# Patient Record
Sex: MALE | Race: WHITE | NOT HISPANIC OR LATINO | URBAN - METROPOLITAN AREA
[De-identification: names, ages, dates, MRNs, and addresses within clinical notes are randomized per-mention and may not be internally consistent; named-entity substitution may affect disease eponyms.]

---

## 2017-04-26 ENCOUNTER — OUTPATIENT (OUTPATIENT)
Dept: OUTPATIENT SERVICES | Facility: HOSPITAL | Age: 77
LOS: 1 days | Discharge: HOME | End: 2017-04-26
Payer: MEDICARE

## 2017-04-26 VITALS
OXYGEN SATURATION: 98 % | DIASTOLIC BLOOD PRESSURE: 87 MMHG | HEIGHT: 70 IN | HEART RATE: 65 BPM | RESPIRATION RATE: 14 BRPM | TEMPERATURE: 98 F | SYSTOLIC BLOOD PRESSURE: 140 MMHG | WEIGHT: 199.96 LBS

## 2017-04-26 DIAGNOSIS — D62 ACUTE POSTHEMORRHAGIC ANEMIA: ICD-10-CM

## 2017-04-26 DIAGNOSIS — M17.12 UNILATERAL PRIMARY OSTEOARTHRITIS, LEFT KNEE: ICD-10-CM

## 2017-04-26 DIAGNOSIS — Z87.09 PERSONAL HISTORY OF OTHER DISEASES OF THE RESPIRATORY SYSTEM: Chronic | ICD-10-CM

## 2017-04-26 DIAGNOSIS — I10 ESSENTIAL (PRIMARY) HYPERTENSION: ICD-10-CM

## 2017-04-26 DIAGNOSIS — Z98.890 OTHER SPECIFIED POSTPROCEDURAL STATES: Chronic | ICD-10-CM

## 2017-04-26 DIAGNOSIS — Z96.651 PRESENCE OF RIGHT ARTIFICIAL KNEE JOINT: Chronic | ICD-10-CM

## 2017-04-26 DIAGNOSIS — N40.0 BENIGN PROSTATIC HYPERPLASIA WITHOUT LOWER URINARY TRACT SYMPTOMS: ICD-10-CM

## 2017-04-26 DIAGNOSIS — Z01.818 ENCOUNTER FOR OTHER PREPROCEDURAL EXAMINATION: ICD-10-CM

## 2017-04-26 DIAGNOSIS — M10.9 GOUT, UNSPECIFIED: ICD-10-CM

## 2017-04-26 DIAGNOSIS — R00.0 TACHYCARDIA, UNSPECIFIED: ICD-10-CM

## 2017-04-26 DIAGNOSIS — I45.10 UNSPECIFIED RIGHT BUNDLE-BRANCH BLOCK: ICD-10-CM

## 2017-04-26 LAB
ANION GAP SERPL CALC-SCNC: 8 MMOL/L — SIGNIFICANT CHANGE UP (ref 5–17)
APPEARANCE UR: CLEAR — SIGNIFICANT CHANGE UP
BACTERIA # UR AUTO: NEGATIVE — SIGNIFICANT CHANGE UP
BASOPHILS # BLD AUTO: 0.1 K/UL — SIGNIFICANT CHANGE UP (ref 0–0.2)
BASOPHILS NFR BLD AUTO: 1 % — SIGNIFICANT CHANGE UP (ref 0–2)
BILIRUB UR-MCNC: NEGATIVE — SIGNIFICANT CHANGE UP
BLD GP AB SCN SERPL QL: SIGNIFICANT CHANGE UP
BUN SERPL-MCNC: 16 MG/DL — SIGNIFICANT CHANGE UP (ref 7–23)
CALCIUM SERPL-MCNC: 8.6 MG/DL — SIGNIFICANT CHANGE UP (ref 8.5–10.1)
CHLORIDE SERPL-SCNC: 108 MMOL/L — SIGNIFICANT CHANGE UP (ref 96–108)
CO2 SERPL-SCNC: 26 MMOL/L — SIGNIFICANT CHANGE UP (ref 22–31)
COLOR SPEC: YELLOW — SIGNIFICANT CHANGE UP
CREAT SERPL-MCNC: 0.78 MG/DL — SIGNIFICANT CHANGE UP (ref 0.5–1.3)
DIFF PNL FLD: NEGATIVE — SIGNIFICANT CHANGE UP
EOSINOPHIL # BLD AUTO: 0.2 K/UL — SIGNIFICANT CHANGE UP (ref 0–0.5)
EOSINOPHIL NFR BLD AUTO: 2.8 % — SIGNIFICANT CHANGE UP (ref 0–6)
EPI CELLS # UR: NEGATIVE — SIGNIFICANT CHANGE UP
GLUCOSE SERPL-MCNC: 91 MG/DL — SIGNIFICANT CHANGE UP (ref 70–99)
GLUCOSE UR QL: NEGATIVE MG/DL — SIGNIFICANT CHANGE UP
HCT VFR BLD CALC: 44.9 % — SIGNIFICANT CHANGE UP (ref 39–50)
HGB BLD-MCNC: 15.2 G/DL — SIGNIFICANT CHANGE UP (ref 13–17)
KETONES UR-MCNC: NEGATIVE — SIGNIFICANT CHANGE UP
LEUKOCYTE ESTERASE UR-ACNC: (no result)
LYMPHOCYTES # BLD AUTO: 2.1 K/UL — SIGNIFICANT CHANGE UP (ref 1–3.3)
LYMPHOCYTES # BLD AUTO: 24.9 % — SIGNIFICANT CHANGE UP (ref 13–44)
MCHC RBC-ENTMCNC: 30.2 PG — SIGNIFICANT CHANGE UP (ref 27–34)
MCHC RBC-ENTMCNC: 33.9 GM/DL — SIGNIFICANT CHANGE UP (ref 32–36)
MCV RBC AUTO: 89.1 FL — SIGNIFICANT CHANGE UP (ref 80–100)
MONOCYTES # BLD AUTO: 0.8 K/UL — SIGNIFICANT CHANGE UP (ref 0–0.9)
MONOCYTES NFR BLD AUTO: 9.4 % — SIGNIFICANT CHANGE UP (ref 2–14)
NEUTROPHILS # BLD AUTO: 5.1 K/UL — SIGNIFICANT CHANGE UP (ref 1.8–7.4)
NEUTROPHILS NFR BLD AUTO: 61.9 % — SIGNIFICANT CHANGE UP (ref 43–77)
NITRITE UR-MCNC: NEGATIVE — SIGNIFICANT CHANGE UP
PH UR: 6 — SIGNIFICANT CHANGE UP (ref 5–8)
PLATELET # BLD AUTO: 218 K/UL — SIGNIFICANT CHANGE UP (ref 150–400)
POTASSIUM SERPL-MCNC: 4.1 MMOL/L — SIGNIFICANT CHANGE UP (ref 3.5–5.3)
POTASSIUM SERPL-SCNC: 4.1 MMOL/L — SIGNIFICANT CHANGE UP (ref 3.5–5.3)
PROT UR-MCNC: NEGATIVE MG/DL — SIGNIFICANT CHANGE UP
RBC # BLD: 5.03 M/UL — SIGNIFICANT CHANGE UP (ref 4.2–5.8)
RBC # FLD: 12 % — SIGNIFICANT CHANGE UP (ref 10.3–14.5)
RBC CASTS # UR COMP ASSIST: SIGNIFICANT CHANGE UP /HPF (ref 0–4)
SODIUM SERPL-SCNC: 142 MMOL/L — SIGNIFICANT CHANGE UP (ref 135–145)
SP GR SPEC: 1.01 — SIGNIFICANT CHANGE UP (ref 1.01–1.02)
TYPE + AB SCN PNL BLD: SIGNIFICANT CHANGE UP
UROBILINOGEN FLD QL: NEGATIVE MG/DL — SIGNIFICANT CHANGE UP
WBC # BLD: 8.3 K/UL — SIGNIFICANT CHANGE UP (ref 3.8–10.5)
WBC # FLD AUTO: 8.3 K/UL — SIGNIFICANT CHANGE UP (ref 3.8–10.5)
WBC UR QL: SIGNIFICANT CHANGE UP

## 2017-04-26 PROCEDURE — 93010 ELECTROCARDIOGRAM REPORT: CPT

## 2017-04-26 PROCEDURE — 73562 X-RAY EXAM OF KNEE 3: CPT | Mod: 26,LT

## 2017-04-26 NOTE — H&P PST ADULT - PSH
History of deviated nasal septum  s/p repair 1987  S/P arthroscopic knee surgery  B/L; 1982, 1992.  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

## 2017-04-26 NOTE — H&P PST ADULT - VISION (WITH CORRECTIVE LENSES IF THE PATIENT USUALLY WEARS THEM):
reading glasses PRN/Partially impaired: cannot see medication labels or newsprint, but can see obstacles in path, and the surrounding layout; can count fingers at arm's length

## 2017-04-26 NOTE — H&P PST ADULT - ASSESSMENT
This is a  76y /o  Male who presents to [facility] prior to proposed procedure with Dr. Jennifer ASHER for left total knee replacement.    1. Labs: per Dr. Jennifer ASHER.  2. PT/INR STAT day of procedure.  3. EKG to be reviewed by Cardiology  4. Knee x-ray to be reviewed by Radiology/Dr. Jennifer ASHER.  5. Clearance with Dr. Garcia PCP, as scheduled  6. EZ sponges, Mupirocin ojntment, and day of procedure instructions provided and reviewed with patient.

## 2017-04-26 NOTE — H&P PST ADULT - PMH
Back spasm    BPH (benign prostatic hyperplasia)    Disc disorder    Gout  H/O  Hypertension    Knee pain, left    Lower back pain    Osteoarthritis of knee  left  Snores

## 2017-04-26 NOTE — H&P PST ADULT - FAMILY HISTORY
Mother  Still living? No  Family history of dementia, Age at diagnosis:      Father  Still living? No  Family history of cerebrovascular accident (CVA) in father, Age at diagnosis: 61-70  Family history of hypertension in father, Age at diagnosis: 71-80

## 2017-04-26 NOTE — H&P PST ADULT - HISTORY OF PRESENT ILLNESS
This is a  76y /o  Male who presents to [facility] prior to proposed procedure with Dr. Jennifer ASHER for left total knee replacement. Repots left knee pain off and on getting worse recently. Reports driving for long period (ie: [place]) causes left knee pain. Seen by Dr. Jennifer ASHER with x-rays taken which demonstrated it was "bone on bone." Evaluated by Dr. Jennifer ASHER and now presents for said procedure.

## 2017-04-27 LAB
MRSA PCR RESULT.: SIGNIFICANT CHANGE UP
S AUREUS DNA NOSE QL NAA+PROBE: DETECTED

## 2017-05-08 RX ORDER — ACETAMINOPHEN 500 MG
975 TABLET ORAL ONCE
Qty: 0 | Refills: 0 | Status: COMPLETED | OUTPATIENT
Start: 2017-05-09 | End: 2017-05-09

## 2017-05-08 RX ORDER — CELECOXIB 200 MG/1
200 CAPSULE ORAL ONCE
Qty: 0 | Refills: 0 | Status: COMPLETED | OUTPATIENT
Start: 2017-05-09 | End: 2017-05-09

## 2017-05-08 RX ORDER — SODIUM CHLORIDE 9 MG/ML
3 INJECTION INTRAMUSCULAR; INTRAVENOUS; SUBCUTANEOUS EVERY 8 HOURS
Qty: 0 | Refills: 0 | Status: DISCONTINUED | OUTPATIENT
Start: 2017-05-09 | End: 2017-05-12

## 2017-05-08 RX ORDER — OXYCODONE HYDROCHLORIDE 5 MG/1
20 TABLET ORAL ONCE
Qty: 0 | Refills: 0 | Status: DISCONTINUED | OUTPATIENT
Start: 2017-05-09 | End: 2017-05-09

## 2017-05-08 RX ORDER — FAMOTIDINE 10 MG/ML
20 INJECTION INTRAVENOUS ONCE
Qty: 0 | Refills: 0 | Status: COMPLETED | OUTPATIENT
Start: 2017-05-09 | End: 2017-05-09

## 2017-05-09 ENCOUNTER — INPATIENT (INPATIENT)
Facility: HOSPITAL | Age: 77
LOS: 2 days | Discharge: HOME HEALTH | End: 2017-05-12
Attending: ORTHOPAEDIC SURGERY | Admitting: ORTHOPAEDIC SURGERY
Payer: MEDICARE

## 2017-05-09 VITALS
HEART RATE: 67 BPM | TEMPERATURE: 98 F | DIASTOLIC BLOOD PRESSURE: 83 MMHG | SYSTOLIC BLOOD PRESSURE: 137 MMHG | WEIGHT: 199.96 LBS | OXYGEN SATURATION: 97 % | HEIGHT: 70 IN | RESPIRATION RATE: 16 BRPM

## 2017-05-09 DIAGNOSIS — Z98.890 OTHER SPECIFIED POSTPROCEDURAL STATES: Chronic | ICD-10-CM

## 2017-05-09 DIAGNOSIS — Z87.09 PERSONAL HISTORY OF OTHER DISEASES OF THE RESPIRATORY SYSTEM: Chronic | ICD-10-CM

## 2017-05-09 DIAGNOSIS — Z96.651 PRESENCE OF RIGHT ARTIFICIAL KNEE JOINT: Chronic | ICD-10-CM

## 2017-05-09 LAB
ANION GAP SERPL CALC-SCNC: 9 MMOL/L — SIGNIFICANT CHANGE UP (ref 5–17)
APTT BLD: 30.6 SEC — SIGNIFICANT CHANGE UP (ref 27.5–37.4)
BUN SERPL-MCNC: 15 MG/DL — SIGNIFICANT CHANGE UP (ref 7–23)
CALCIUM SERPL-MCNC: 7.9 MG/DL — LOW (ref 8.5–10.1)
CHLORIDE SERPL-SCNC: 108 MMOL/L — SIGNIFICANT CHANGE UP (ref 96–108)
CO2 SERPL-SCNC: 27 MMOL/L — SIGNIFICANT CHANGE UP (ref 22–31)
CREAT SERPL-MCNC: 0.82 MG/DL — SIGNIFICANT CHANGE UP (ref 0.5–1.3)
GLUCOSE SERPL-MCNC: 99 MG/DL — SIGNIFICANT CHANGE UP (ref 70–99)
HCT VFR BLD CALC: 40 % — SIGNIFICANT CHANGE UP (ref 39–50)
HGB BLD-MCNC: 13.3 G/DL — SIGNIFICANT CHANGE UP (ref 13–17)
INR BLD: 1 RATIO — SIGNIFICANT CHANGE UP (ref 0.88–1.16)
INR BLD: 1.06 RATIO — SIGNIFICANT CHANGE UP (ref 0.88–1.16)
MCHC RBC-ENTMCNC: 29.4 PG — SIGNIFICANT CHANGE UP (ref 27–34)
MCHC RBC-ENTMCNC: 33.2 GM/DL — SIGNIFICANT CHANGE UP (ref 32–36)
MCV RBC AUTO: 88.4 FL — SIGNIFICANT CHANGE UP (ref 80–100)
PLATELET # BLD AUTO: 204 K/UL — SIGNIFICANT CHANGE UP (ref 150–400)
POTASSIUM SERPL-MCNC: 4.3 MMOL/L — SIGNIFICANT CHANGE UP (ref 3.5–5.3)
POTASSIUM SERPL-SCNC: 4.3 MMOL/L — SIGNIFICANT CHANGE UP (ref 3.5–5.3)
PROTHROM AB SERPL-ACNC: 10.8 SEC — SIGNIFICANT CHANGE UP (ref 9.8–12.7)
PROTHROM AB SERPL-ACNC: 11.5 SEC — SIGNIFICANT CHANGE UP (ref 9.8–12.7)
RBC # BLD: 4.52 M/UL — SIGNIFICANT CHANGE UP (ref 4.2–5.8)
RBC # FLD: 12.3 % — SIGNIFICANT CHANGE UP (ref 10.3–14.5)
SODIUM SERPL-SCNC: 144 MMOL/L — SIGNIFICANT CHANGE UP (ref 135–145)
WBC # BLD: 11.4 K/UL — HIGH (ref 3.8–10.5)
WBC # FLD AUTO: 11.4 K/UL — HIGH (ref 3.8–10.5)

## 2017-05-09 PROCEDURE — 73560 X-RAY EXAM OF KNEE 1 OR 2: CPT | Mod: 26,LT

## 2017-05-09 PROCEDURE — 88305 TISSUE EXAM BY PATHOLOGIST: CPT | Mod: 26

## 2017-05-09 PROCEDURE — 99223 1ST HOSP IP/OBS HIGH 75: CPT

## 2017-05-09 RX ORDER — SODIUM CHLORIDE 9 MG/ML
1000 INJECTION INTRAMUSCULAR; INTRAVENOUS; SUBCUTANEOUS
Qty: 0 | Refills: 0 | Status: DISCONTINUED | OUTPATIENT
Start: 2017-05-09 | End: 2017-05-12

## 2017-05-09 RX ORDER — ACETAMINOPHEN 500 MG
975 TABLET ORAL EVERY 6 HOURS
Qty: 0 | Refills: 0 | Status: DISCONTINUED | OUTPATIENT
Start: 2017-05-09 | End: 2017-05-12

## 2017-05-09 RX ORDER — MEPERIDINE HYDROCHLORIDE 50 MG/ML
12.5 INJECTION INTRAMUSCULAR; INTRAVENOUS; SUBCUTANEOUS
Qty: 0 | Refills: 0 | Status: DISCONTINUED | OUTPATIENT
Start: 2017-05-09 | End: 2017-05-09

## 2017-05-09 RX ORDER — CEFAZOLIN SODIUM 1 G
2000 VIAL (EA) INJECTION EVERY 6 HOURS
Qty: 0 | Refills: 0 | Status: COMPLETED | OUTPATIENT
Start: 2017-05-09 | End: 2017-05-09

## 2017-05-09 RX ORDER — PANTOPRAZOLE SODIUM 20 MG/1
40 TABLET, DELAYED RELEASE ORAL DAILY
Qty: 0 | Refills: 0 | Status: DISCONTINUED | OUTPATIENT
Start: 2017-05-09 | End: 2017-05-12

## 2017-05-09 RX ORDER — OXYCODONE HYDROCHLORIDE 5 MG/1
20 TABLET ORAL EVERY 12 HOURS
Qty: 0 | Refills: 0 | Status: DISCONTINUED | OUTPATIENT
Start: 2017-05-09 | End: 2017-05-11

## 2017-05-09 RX ORDER — ONDANSETRON 8 MG/1
4 TABLET, FILM COATED ORAL EVERY 6 HOURS
Qty: 0 | Refills: 0 | Status: DISCONTINUED | OUTPATIENT
Start: 2017-05-09 | End: 2017-05-12

## 2017-05-09 RX ORDER — ACETAMINOPHEN 500 MG
650 TABLET ORAL EVERY 6 HOURS
Qty: 0 | Refills: 0 | Status: DISCONTINUED | OUTPATIENT
Start: 2017-05-09 | End: 2017-05-12

## 2017-05-09 RX ORDER — ASPIRIN/CALCIUM CARB/MAGNESIUM 324 MG
325 TABLET ORAL
Qty: 0 | Refills: 0 | Status: DISCONTINUED | OUTPATIENT
Start: 2017-05-10 | End: 2017-05-12

## 2017-05-09 RX ORDER — DIPHENHYDRAMINE HCL 50 MG
25 CAPSULE ORAL AT BEDTIME
Qty: 0 | Refills: 0 | Status: DISCONTINUED | OUTPATIENT
Start: 2017-05-09 | End: 2017-05-12

## 2017-05-09 RX ORDER — FINASTERIDE 5 MG/1
1 TABLET, FILM COATED ORAL
Qty: 0 | Refills: 0 | COMMUNITY

## 2017-05-09 RX ORDER — ASCORBIC ACID 60 MG
500 TABLET,CHEWABLE ORAL
Qty: 0 | Refills: 0 | Status: DISCONTINUED | OUTPATIENT
Start: 2017-05-09 | End: 2017-05-12

## 2017-05-09 RX ORDER — HYDROMORPHONE HYDROCHLORIDE 2 MG/ML
0.5 INJECTION INTRAMUSCULAR; INTRAVENOUS; SUBCUTANEOUS
Qty: 0 | Refills: 0 | Status: DISCONTINUED | OUTPATIENT
Start: 2017-05-09 | End: 2017-05-12

## 2017-05-09 RX ORDER — OXYCODONE HYDROCHLORIDE 5 MG/1
5 TABLET ORAL EVERY 6 HOURS
Qty: 0 | Refills: 0 | Status: DISCONTINUED | OUTPATIENT
Start: 2017-05-09 | End: 2017-05-12

## 2017-05-09 RX ORDER — ONDANSETRON 8 MG/1
4 TABLET, FILM COATED ORAL ONCE
Qty: 0 | Refills: 0 | Status: DISCONTINUED | OUTPATIENT
Start: 2017-05-09 | End: 2017-05-09

## 2017-05-09 RX ORDER — CELECOXIB 200 MG/1
200 CAPSULE ORAL
Qty: 0 | Refills: 0 | Status: DISCONTINUED | OUTPATIENT
Start: 2017-05-09 | End: 2017-05-12

## 2017-05-09 RX ORDER — SENNA PLUS 8.6 MG/1
2 TABLET ORAL AT BEDTIME
Qty: 0 | Refills: 0 | Status: DISCONTINUED | OUTPATIENT
Start: 2017-05-09 | End: 2017-05-12

## 2017-05-09 RX ORDER — FINASTERIDE 5 MG/1
5 TABLET, FILM COATED ORAL DAILY
Qty: 0 | Refills: 0 | Status: DISCONTINUED | OUTPATIENT
Start: 2017-05-09 | End: 2017-05-12

## 2017-05-09 RX ORDER — DOCUSATE SODIUM 100 MG
100 CAPSULE ORAL THREE TIMES A DAY
Qty: 0 | Refills: 0 | Status: DISCONTINUED | OUTPATIENT
Start: 2017-05-09 | End: 2017-05-12

## 2017-05-09 RX ORDER — OXYCODONE HYDROCHLORIDE 5 MG/1
10 TABLET ORAL EVERY 6 HOURS
Qty: 0 | Refills: 0 | Status: DISCONTINUED | OUTPATIENT
Start: 2017-05-09 | End: 2017-05-12

## 2017-05-09 RX ORDER — FENTANYL CITRATE 50 UG/ML
50 INJECTION INTRAVENOUS
Qty: 0 | Refills: 0 | Status: DISCONTINUED | OUTPATIENT
Start: 2017-05-09 | End: 2017-05-09

## 2017-05-09 RX ORDER — AMLODIPINE BESYLATE 2.5 MG/1
5 TABLET ORAL DAILY
Qty: 0 | Refills: 0 | Status: DISCONTINUED | OUTPATIENT
Start: 2017-05-09 | End: 2017-05-12

## 2017-05-09 RX ORDER — FERROUS SULFATE 325(65) MG
325 TABLET ORAL
Qty: 0 | Refills: 0 | Status: DISCONTINUED | OUTPATIENT
Start: 2017-05-09 | End: 2017-05-12

## 2017-05-09 RX ORDER — FOLIC ACID 0.8 MG
1 TABLET ORAL DAILY
Qty: 0 | Refills: 0 | Status: DISCONTINUED | OUTPATIENT
Start: 2017-05-09 | End: 2017-05-12

## 2017-05-09 RX ORDER — POLYETHYLENE GLYCOL 3350 17 G/17G
17 POWDER, FOR SOLUTION ORAL DAILY
Qty: 0 | Refills: 0 | Status: DISCONTINUED | OUTPATIENT
Start: 2017-05-09 | End: 2017-05-12

## 2017-05-09 RX ORDER — AMLODIPINE BESYLATE 2.5 MG/1
1 TABLET ORAL
Qty: 0 | Refills: 0 | COMMUNITY

## 2017-05-09 RX ORDER — SODIUM CHLORIDE 9 MG/ML
1000 INJECTION, SOLUTION INTRAVENOUS
Qty: 0 | Refills: 0 | Status: DISCONTINUED | OUTPATIENT
Start: 2017-05-09 | End: 2017-05-09

## 2017-05-09 RX ORDER — MAGNESIUM HYDROXIDE 400 MG/1
30 TABLET, CHEWABLE ORAL DAILY
Qty: 0 | Refills: 0 | Status: DISCONTINUED | OUTPATIENT
Start: 2017-05-09 | End: 2017-05-12

## 2017-05-09 RX ADMIN — ONDANSETRON 4 MILLIGRAM(S): 8 TABLET, FILM COATED ORAL at 16:16

## 2017-05-09 RX ADMIN — AMLODIPINE BESYLATE 5 MILLIGRAM(S): 2.5 TABLET ORAL at 21:49

## 2017-05-09 RX ADMIN — CELECOXIB 200 MILLIGRAM(S): 200 CAPSULE ORAL at 16:16

## 2017-05-09 RX ADMIN — Medication 975 MILLIGRAM(S): at 16:17

## 2017-05-09 RX ADMIN — Medication 500 MILLIGRAM(S): at 17:49

## 2017-05-09 RX ADMIN — FAMOTIDINE 20 MILLIGRAM(S): 10 INJECTION INTRAVENOUS at 10:23

## 2017-05-09 RX ADMIN — SODIUM CHLORIDE 100 MILLILITER(S): 9 INJECTION, SOLUTION INTRAVENOUS at 12:43

## 2017-05-09 RX ADMIN — Medication 975 MILLIGRAM(S): at 23:15

## 2017-05-09 RX ADMIN — FINASTERIDE 5 MILLIGRAM(S): 5 TABLET, FILM COATED ORAL at 21:50

## 2017-05-09 RX ADMIN — Medication 1 TABLET(S): at 21:49

## 2017-05-09 RX ADMIN — Medication 100 MILLIGRAM(S): at 23:16

## 2017-05-09 RX ADMIN — CELECOXIB 200 MILLIGRAM(S): 200 CAPSULE ORAL at 16:50

## 2017-05-09 RX ADMIN — Medication 100 MILLIGRAM(S): at 21:50

## 2017-05-09 RX ADMIN — CELECOXIB 200 MILLIGRAM(S): 200 CAPSULE ORAL at 10:23

## 2017-05-09 RX ADMIN — OXYCODONE HYDROCHLORIDE 20 MILLIGRAM(S): 5 TABLET ORAL at 10:24

## 2017-05-09 RX ADMIN — SODIUM CHLORIDE 75 MILLILITER(S): 9 INJECTION INTRAMUSCULAR; INTRAVENOUS; SUBCUTANEOUS at 16:08

## 2017-05-09 RX ADMIN — Medication 975 MILLIGRAM(S): at 10:23

## 2017-05-09 RX ADMIN — Medication 100 MILLIGRAM(S): at 16:08

## 2017-05-09 RX ADMIN — SODIUM CHLORIDE 3 MILLILITER(S): 9 INJECTION INTRAMUSCULAR; INTRAVENOUS; SUBCUTANEOUS at 21:56

## 2017-05-09 RX ADMIN — Medication 1 TABLET(S): at 17:49

## 2017-05-09 RX ADMIN — Medication 975 MILLIGRAM(S): at 16:50

## 2017-05-09 NOTE — PHYSICAL THERAPY INITIAL EVALUATION ADULT - CRITERIA FOR SKILLED THERAPEUTIC INTERVENTIONS
rehab potential/anticipated discharge recommendation/risk reduction/prevention/therapy frequency/functional limitations in following categories/predicted duration of therapy intervention/anticipated equipment needs at discharge

## 2017-05-09 NOTE — CONSULT NOTE ADULT - ASSESSMENT
This is a 76 year old male s/p left total knee replacement with moderate thrombosis risk due to BMI, however ambulating well with PT- and low bleeding risk.     Discussed the risks vs benefits of full dose aspirin therapy with patient. Agrees with treatment and understands the necessity of therapy.    Plan:    Enteric coated ASA 325mg PO BID x 30 days  Pepcid 20 mg PO daily  Daily CBC/BMP  Venodynes  Enc ambulation    Thank you for this consult, will sign off for now, do not hesitate to reconsult.

## 2017-05-09 NOTE — PHYSICAL THERAPY INITIAL EVALUATION ADULT - ACTIVE RANGE OF MOTION EXAMINATION, REHAB EVAL
monisha. upper extremity Active ROM was WNL (within normal limits)/bilateral  lower extremity Active ROM was WFL (within functional limits)/L TKR 0-105P

## 2017-05-09 NOTE — CONSULT NOTE ADULT - SUBJECTIVE AND OBJECTIVE BOX
HPI  HPI:      Patient is a 76y old  Male who presents with a chief complaint of " I am having my left knee replaced." (09 May 2017 10:03)      Consulted by Dr. Rodriguez for VTE prophylaxis, risk stratification, and anticoagulation management.    PAST MEDICAL & SURGICAL HISTORY:  Gout: H/O  BPH (benign prostatic hyperplasia)  Snores  Hypertension  Lower back pain  Back spasm  Disc disorder  Osteoarthritis of knee: left  Knee pain, left  S/P arthroscopic knee surgery: B/L; 1982, 1992.  Status post total right knee replacement: 12/2015  History of deviated nasal septum: s/p repair 1987      BMI: 28.7    CrCl: 81.6    Caprini VTE Risk Score: 8    IMPROVE Bleeding Risk Score: 2.5 (low)    Falls Risk:   High ( x )  Mod (  )  Low (  )      FAMILY HISTORY:  Family history of hypertension in father (Father)  Family history of cerebrovascular accident (CVA) in father (Father)  Family history of dementia (Mother)    Denies any personal or familial history of clotting or bleeding disorders.    Allergies    No Known Allergies    Intolerances        REVIEW OF SYSTEMS    (  )Fever	     (  )Constipation	(  )SOB				(  )Headache	(  )Dysuria  (  )Chills	     (  )Melena	(  )Dyspnea present on exertion	                    (  )Dizziness                    (  )Polyuria  (  )Nausea	     (  )Hematochezia	(  )Cough			                    (  )Syncope   	(  )Hematuria  (  )Vomiting    (  )Chest Pain	(  )Wheezing			(  )Weakness  (  )Diarrhea     (  )Palpitations	(  )Anorexia			(  )Myalgia    All other review of systems negative: Yes    Unable to obtain review of systems due to:      PHYSICAL EXAM:    Constitutional: Appears Well    Neurological: A& O x 3    Skin: Warm    Respiratory and Thorax: normal effort; Breath sounds: normal; No rales/wheezing/rhonchi  	  Cardiovascular: S1, S2, regular, NMBR	    Gastrointestinal: BS + x 4Q, nontender	    Genitourinary:  Bladder nondistended, nontender    Musculoskeletal:   General Right:   no muscle/joint tenderness,   normal tone, no joint swelling,   ROM: full	    General Left:   + muscle/joint tenderness,   normal tone, no joint swelling,   ROM: limited    Knee:  Left: Incision: ; Dressing CDI; Drain: hemovac ; Type of drng.: serosang.; Amt. of drng: small    Lower extrems:   Right: no calf tenderness              negative meme's sign               + pedal pulses    Left:   no calf tenderness              negative meme's sign               + pedal pulses                          13.3   11.4  )-----------( 204      ( 09 May 2017 13:34 )             40.0       05-09    144  |  108  |  15  ----------------------------<  99  4.3   |  27  |  0.82    Ca    7.9<L>      09 May 2017 13:34        PT/INR - ( 09 May 2017 13:34 )   PT: 11.5 sec;   INR: 1.06 ratio         PTT - ( 09 May 2017 09:51 )  PTT:30.6 sec				    MEDICATIONS  (STANDING):  sodium chloride 0.9% lock flush 3milliLiter(s) IV Push every 8 hours  acetaminophen   Tablet. 975milliGRAM(s) Oral every 6 hours  oxyCODONE ER Tablet 20milliGRAM(s) Oral every 12 hours  celecoxib 200milliGRAM(s) Oral with breakfast  sodium chloride 0.9%. 1000milliLiter(s) IV Continuous <Continuous>  calcium carbonate 1250 mG + Vitamin D (OsCal 500 + D) 1Tablet(s) Oral three times a day  pantoprazole    Tablet 40milliGRAM(s) Oral daily  polyethylene glycol 3350 17Gram(s) Oral daily  docusate sodium 100milliGRAM(s) Oral three times a day  ferrous    sulfate 325milliGRAM(s) Oral three times a day with meals  folic acid 1milliGRAM(s) Oral daily  multivitamin 1Tablet(s) Oral daily  ascorbic acid 500milliGRAM(s) Oral two times a day  amLODIPine   Tablet 5milliGRAM(s) Oral daily  finasteride 5milliGRAM(s) Oral daily  ceFAZolin   IVPB 2000milliGRAM(s) IV Intermittent every 6 hours      Vital Signs Last 24 Hrs  T(C): 36.6, Max: 36.7 (05-09 @ 09:59)  T(F): 97.8, Max: 98.1 (05-09 @ 09:59)  HR: 62 (58 - 73)  BP: 116/71 (113/79 - 142/84)  BP(mean): --  RR: 16 (12 - 17)  SpO2: 98% (93% - 100%)    DVT Prophylaxis:  LMWH                   (  )  Heparin SQ           (  )  Coumadin             (  )  Xarelto                  (  )  Eliquis                   (  )  Venodynes           ( x )  Ambulation          ( x )  UFH                       (  )  Contraindicated  (  )

## 2017-05-09 NOTE — CONSULT NOTE ADULT - SUBJECTIVE AND OBJECTIVE BOX
CC-Patient is a 76y old  Male who presents with a chief complaint of " I am having my left knee replaced." (09 May 2017 10:03)    HPI:  77yo/M with PMH BPH, HTN presented for elective LT TKR. Medical consult called for postop medical management.    PMH- as above  PSH- s/p RT TKR, deviated septum  Soc hx- alcohol- socially  Fam hx- m-dementia, f- HTN    5/9/17- +nausea    Review of system- All 10 systems reviewed and is as per HPI otherwise negative.     T(C): 36.6, Max: 36.7 (05-09 @ 09:59)  HR: 62 (58 - 73)  BP: 116/71 (113/79 - 142/84)  RR: 16 (12 - 17)  SpO2: 98% (93% - 100%)  Wt(kg): --    LABS:                        13.3   11.4  )-----------( 204      ( 09 May 2017 13:34 )             40.0     05-09    144  |  108  |  15  ----------------------------<  99  4.3   |  27  |  0.82    Ca   7.9<L>      09 May 2017 13:34  PT/INR - ( 09 May 2017 13:34 )   PT: 11.5 sec;   INR: 1.06 ratio    PTT - ( 09 May 2017 09:51 )  PTT:30.6 sec    PHYSICAL EXAM:    GENERAL: NAD, well-groomed, well-developed  HEAD:  Atraumatic, Normocephalic  EYES: EOMI, PERRLA, conjunctiva and sclera clear  HEENT: Moist mucous membranes  NECK: Supple, No JVD  NERVOUS SYSTEM:  Alert & Oriented X3, Motor Strength 5/5 B/L upper and lower extremities; DTRs 2+ intact and symmetric  CHEST/LUNG: Clear to auscultation bilaterally; No rales, rhonchi, wheezing, or rubs  HEART: Regular rate and rhythm; No murmurs, rubs, or gallops  ABDOMEN: Soft, Nontender, Nondistended; Bowel sounds present  GENITOURINARY- Voiding, no palpable bladder  EXTREMITIES:  2+ Peripheral Pulses, No clubbing, cyanosis, or edema  MUSCULOSKELTAL- LT knee dressing dry, +hemovac      Daily Height in cm: 177.8 (09 May 2017 09:59)        sodium chloride 0.9% lock flush 3milliLiter(s) IV Push every 8 hours  acetaminophen   Tablet. 975milliGRAM(s) Oral every 6 hours  oxyCODONE ER Tablet 20milliGRAM(s) Oral every 12 hours  celecoxib 200milliGRAM(s) Oral with breakfast  oxyCODONE IR 5milliGRAM(s) Oral every 6 hours PRN  oxyCODONE IR 10milliGRAM(s) Oral every 6 hours PRN  HYDROmorphone  Injectable 0.5milliGRAM(s) IV Push every 3 hours PRN  ondansetron Injectable 4milliGRAM(s) IV Push every 6 hours PRN  sodium chloride 0.9%. 1000milliLiter(s) IV Continuous <Continuous>  acetaminophen   Tablet 650milliGRAM(s) Oral every 6 hours PRN  acetaminophen   Tablet 650milliGRAM(s) Oral every 6 hours PRN  acetaminophen   Tablet. 650milliGRAM(s) Oral every 6 hours PRN  aluminum hydroxide/magnesium hydroxide/simethicone Suspension 30milliLiter(s) Oral four times a day PRN  ondansetron Injectable 4milliGRAM(s) IV Push every 6 hours PRN  calcium carbonate 1250 mG + Vitamin D (OsCal 500 + D) 1Tablet(s) Oral three times a day  pantoprazole    Tablet 40milliGRAM(s) Oral daily  diphenhydrAMINE   Capsule 25milliGRAM(s) Oral at bedtime PRN  magnesium hydroxide Suspension 30milliLiter(s) Oral daily PRN  polyethylene glycol 3350 17Gram(s) Oral daily  senna 2Tablet(s) Oral at bedtime PRN  docusate sodium 100milliGRAM(s) Oral three times a day  ferrous    sulfate 325milliGRAM(s) Oral three times a day with meals  folic acid 1milliGRAM(s) Oral daily  multivitamin 1Tablet(s) Oral daily  ascorbic acid 500milliGRAM(s) Oral two times a day  amLODIPine   Tablet 5milliGRAM(s) Oral daily  finasteride 5milliGRAM(s) Oral daily  ceFAZolin   IVPB 2000milliGRAM(s) IV Intermittent every 6 hours      Assessment/Plan  #S/p LT TKR  Ortho f/u appreciated  Monitor HH  PT as tolerated  AC consult  Bowel regimen  Incentive spirometry    #BPH  Monitor for voiding difficulties    #HTN, stable    #Dispo- thank you for consult, will follow with you

## 2017-05-10 LAB
ANION GAP SERPL CALC-SCNC: 7 MMOL/L — SIGNIFICANT CHANGE UP (ref 5–17)
BUN SERPL-MCNC: 25 MG/DL — HIGH (ref 7–23)
CALCIUM SERPL-MCNC: 8.6 MG/DL — SIGNIFICANT CHANGE UP (ref 8.5–10.1)
CHLORIDE SERPL-SCNC: 107 MMOL/L — SIGNIFICANT CHANGE UP (ref 96–108)
CO2 SERPL-SCNC: 27 MMOL/L — SIGNIFICANT CHANGE UP (ref 22–31)
CREAT SERPL-MCNC: 0.8 MG/DL — SIGNIFICANT CHANGE UP (ref 0.5–1.3)
GLUCOSE SERPL-MCNC: 119 MG/DL — HIGH (ref 70–99)
HCT VFR BLD CALC: 35.1 % — LOW (ref 39–50)
HGB BLD-MCNC: 12 G/DL — LOW (ref 13–17)
MCHC RBC-ENTMCNC: 30.6 PG — SIGNIFICANT CHANGE UP (ref 27–34)
MCHC RBC-ENTMCNC: 34.2 GM/DL — SIGNIFICANT CHANGE UP (ref 32–36)
MCV RBC AUTO: 89.5 FL — SIGNIFICANT CHANGE UP (ref 80–100)
PLATELET # BLD AUTO: 177 K/UL — SIGNIFICANT CHANGE UP (ref 150–400)
POTASSIUM SERPL-MCNC: 5 MMOL/L — SIGNIFICANT CHANGE UP (ref 3.5–5.3)
POTASSIUM SERPL-SCNC: 5 MMOL/L — SIGNIFICANT CHANGE UP (ref 3.5–5.3)
RBC # BLD: 3.92 M/UL — LOW (ref 4.2–5.8)
RBC # FLD: 12.1 % — SIGNIFICANT CHANGE UP (ref 10.3–14.5)
SODIUM SERPL-SCNC: 141 MMOL/L — SIGNIFICANT CHANGE UP (ref 135–145)
WBC # BLD: 15.7 K/UL — HIGH (ref 3.8–10.5)
WBC # FLD AUTO: 15.7 K/UL — HIGH (ref 3.8–10.5)

## 2017-05-10 RX ORDER — MUPIROCIN 20 MG/G
1 OINTMENT TOPICAL
Qty: 0 | Refills: 0 | COMMUNITY

## 2017-05-10 RX ORDER — ASPIRIN/CALCIUM CARB/MAGNESIUM 324 MG
0 TABLET ORAL
Qty: 0 | Refills: 0 | COMMUNITY

## 2017-05-10 RX ORDER — FAMOTIDINE 10 MG/ML
1 INJECTION INTRAVENOUS
Qty: 14 | Refills: 0 | OUTPATIENT
Start: 2017-05-10 | End: 2017-05-24

## 2017-05-10 RX ORDER — ASPIRIN/CALCIUM CARB/MAGNESIUM 324 MG
1 TABLET ORAL
Qty: 60 | Refills: 0 | OUTPATIENT
Start: 2017-05-10 | End: 2017-06-09

## 2017-05-10 RX ORDER — DOCUSATE SODIUM 100 MG
1 CAPSULE ORAL
Qty: 28 | Refills: 0 | OUTPATIENT
Start: 2017-05-10 | End: 2017-05-24

## 2017-05-10 RX ADMIN — CELECOXIB 200 MILLIGRAM(S): 200 CAPSULE ORAL at 09:54

## 2017-05-10 RX ADMIN — SODIUM CHLORIDE 75 MILLILITER(S): 9 INJECTION INTRAMUSCULAR; INTRAVENOUS; SUBCUTANEOUS at 06:39

## 2017-05-10 RX ADMIN — AMLODIPINE BESYLATE 5 MILLIGRAM(S): 2.5 TABLET ORAL at 06:35

## 2017-05-10 RX ADMIN — Medication 100 MILLIGRAM(S): at 06:36

## 2017-05-10 RX ADMIN — Medication 975 MILLIGRAM(S): at 12:30

## 2017-05-10 RX ADMIN — PANTOPRAZOLE SODIUM 40 MILLIGRAM(S): 20 TABLET, DELAYED RELEASE ORAL at 12:32

## 2017-05-10 RX ADMIN — Medication 1 TABLET(S): at 12:29

## 2017-05-10 RX ADMIN — Medication 325 MILLIGRAM(S): at 17:17

## 2017-05-10 RX ADMIN — Medication 975 MILLIGRAM(S): at 18:02

## 2017-05-10 RX ADMIN — SODIUM CHLORIDE 3 MILLILITER(S): 9 INJECTION INTRAMUSCULAR; INTRAVENOUS; SUBCUTANEOUS at 06:38

## 2017-05-10 RX ADMIN — Medication 1 MILLIGRAM(S): at 12:33

## 2017-05-10 RX ADMIN — Medication 975 MILLIGRAM(S): at 07:35

## 2017-05-10 RX ADMIN — Medication 325 MILLIGRAM(S): at 12:33

## 2017-05-10 RX ADMIN — Medication 100 MILLIGRAM(S): at 13:33

## 2017-05-10 RX ADMIN — Medication 1 TABLET(S): at 13:33

## 2017-05-10 RX ADMIN — Medication 500 MILLIGRAM(S): at 17:15

## 2017-05-10 RX ADMIN — FINASTERIDE 5 MILLIGRAM(S): 5 TABLET, FILM COATED ORAL at 12:32

## 2017-05-10 RX ADMIN — SODIUM CHLORIDE 3 MILLILITER(S): 9 INJECTION INTRAMUSCULAR; INTRAVENOUS; SUBCUTANEOUS at 13:34

## 2017-05-10 RX ADMIN — Medication 325 MILLIGRAM(S): at 09:54

## 2017-05-10 RX ADMIN — Medication 1 TABLET(S): at 06:35

## 2017-05-10 RX ADMIN — Medication 325 MILLIGRAM(S): at 06:35

## 2017-05-10 RX ADMIN — Medication 975 MILLIGRAM(S): at 06:35

## 2017-05-10 RX ADMIN — Medication 500 MILLIGRAM(S): at 06:35

## 2017-05-10 RX ADMIN — SODIUM CHLORIDE 3 MILLILITER(S): 9 INJECTION INTRAMUSCULAR; INTRAVENOUS; SUBCUTANEOUS at 21:40

## 2017-05-10 RX ADMIN — Medication 975 MILLIGRAM(S): at 17:18

## 2017-05-10 RX ADMIN — Medication 325 MILLIGRAM(S): at 17:16

## 2017-05-10 RX ADMIN — POLYETHYLENE GLYCOL 3350 17 GRAM(S): 17 POWDER, FOR SOLUTION ORAL at 12:30

## 2017-05-10 RX ADMIN — CELECOXIB 200 MILLIGRAM(S): 200 CAPSULE ORAL at 10:54

## 2017-05-10 RX ADMIN — Medication 100 MILLIGRAM(S): at 21:37

## 2017-05-10 NOTE — DISCHARGE NOTE ADULT - CARE PLAN
Principal Discharge DX:	Knee pain, left  Goal:	Return to ADLs  Instructions for follow-up, activity and diet:	1.	Pain Control  2.	Walking with full weight bearing as tolerated, with assistive devices (walker/Cane as Needed)  3.	DVT Prophylaxis per anticoagulation team recommendations  4.	PT as needed  5.	Follow up with Dr. Rodriguez as Outpatient in 10-14 Days after Discharge from the Hospital or Rehab. Call Office For Appointment.   6.	Remove Staples Post-Op Day 14, and Remove Dressing Post-Op Day 10, with Daily Dressing Changes as Need.  7.	Ice affected area as Needed  8.	Keep Dressing Clean and dry. Principal Discharge DX:	Knee pain, left  Goal:	Return to ADLs  Instructions for follow-up, activity and diet:	1.	Pain Control  2.	Walking with full weight bearing as tolerated, with assistive devices (walker/Cane as Needed)  3.	DVT Prophylaxis per anticoagulation team recommendations  4.	PT as needed  5.	Follow up with Dr. Rordiguez as Outpatient in 10-14 Days after Discharge from the Hospital or Rehab. Call Office For Appointment.   6.	Remove Staples Post-Op Day 14, and Remove Dressing Post-Op Day 10, with Daily Dressing Changes as Need.  7.	Ice affected area as Needed  8.	Keep Dressing Clean and dry. Principal Discharge DX:	Knee pain, left  Goal:	Return to ADLs  Instructions for follow-up, activity and diet:	Discharge Instructions for Total Knee Arthroplasty    1.  Diet: Resume previous diet    2. Activity: WBAT, Rolling walker, Daily PT. Gentle ROM 0-full as tolerated.     3. Call with: fever over 101, wound redness, drainage or open area, calf pain/calf swelling    4. Wound Care: Remove old and place new Aquacel  bandage to Knee every 7 days (5/21). Remove Sutures/Staples Post Op Day #14 (5/23) so long as wound is healed, no drainage or open area. OK to Shower with Aquacel.  Avoid direct water beating on bandage.  Continue ICE packs to knee.    5. DVT PE Prophylaxis:  Continue Aspirin for 30 days total. With pepcid.    6. Labs: Check H&H weekly while on Anticoagulation    7. Follow Up: Orthopedics, 10-14 days in office. Call to schedule. If going home, eRX sent to your pharmacy for .

## 2017-05-10 NOTE — DISCHARGE NOTE ADULT - MEDICATION SUMMARY - MEDICATIONS TO STOP TAKING
I will STOP taking the medications listed below when I get home from the hospital:    aspirin 81 mg oral delayed release capsule  --  by mouth   -- pt instructed to follow Dr. Kay pre-procedure instructions.    mupirocin 2% topical ointment  -- Apply on skin to affected area

## 2017-05-10 NOTE — DISCHARGE NOTE ADULT - CARE PROVIDER_API CALL
Linden Rodriguez), Orthopaedic Surgery  [address] [place] [address]  Phone: [phone]  Fax: [phone]

## 2017-05-10 NOTE — DISCHARGE NOTE ADULT - PLAN OF CARE
Return to ADLs 1.	Pain Control  2.	Walking with full weight bearing as tolerated, with assistive devices (walker/Cane as Needed)  3.	DVT Prophylaxis per anticoagulation team recommendations  4.	PT as needed  5.	Follow up with Dr. Rodriguez as Outpatient in 10-14 Days after Discharge from the Hospital or Rehab. Call Office For Appointment.   6.	Remove Staples Post-Op Day 14, and Remove Dressing Post-Op Day 10, with Daily Dressing Changes as Need.  7.	Ice affected area as Needed  8.	Keep Dressing Clean and dry. Discharge Instructions for Total Knee Arthroplasty    1.  Diet: Resume previous diet    2. Activity: WBAT, Rolling walker, Daily PT. Gentle ROM 0-full as tolerated.     3. Call with: fever over 101, wound redness, drainage or open area, calf pain/calf swelling    4. Wound Care: Remove old and place new Aquacel  bandage to Knee every 7 days (5/21). Remove Sutures/Staples Post Op Day #14 (5/23) so long as wound is healed, no drainage or open area. OK to Shower with Aquacel.  Avoid direct water beating on bandage.  Continue ICE packs to knee.    5. DVT PE Prophylaxis:  Continue Aspirin for 30 days total. With pepcid.    6. Labs: Check H&H weekly while on Anticoagulation    7. Follow Up: Orthopedics, 10-14 days in office. Call to schedule. If going home, eRX sent to your pharmacy for .

## 2017-05-10 NOTE — DISCHARGE NOTE ADULT - HOSPITAL COURSE
The patient is a 76 year old male status post elective total knee Arthroplasty to the left knee after failing outpatient nonoperative conservative management.  Patient presented to [facility] after being medically cleared for an elective surgical procedure. The patient was taken to the operating room on date mentioned above. Prophylactic antibiotics were started before the procedure and continued for 24 hours.  There were no complications during the procedure and patient tolerated the procedure well.  The patient was transferred to recovery room in stable condition and subsequently to surgical floor.  Patient was placed on Aspirin for anticoagulation.  All home medications were continued.  The patient received physical therapy daily and daily labs were followed.  The dressing was kept clean, dry, intact.  The rest of the hospital stay was unremarkable. H&P:  Pt is a76y Male POD 3 s/p Left Total Knee Arthroplasty. Pt is afebrile with stable vital signs. Pain is controlled. Alert and Oriented. Exam reveals intact EHL FHL TA GS, +DP. Dressing is clean and dry with a New Aquacel bandage on.    Vital Signs Last 24 Hrs  T(C): 36.8, Max: 37.6 (05-11 @ 21:19)  T(F): 98.3, Max: 99.7 (05-11 @ 21:19)  HR: 81 (81 - 97)  BP: 147/88 (96/62 - 147/88)  BP(mean): --  RR: 16 (16 - 16)  SpO2: 99% (96% - 99%)                        10.5   11.4  )-----------( 157      ( 12 May 2017 06:06 )             32.6         Hospital Course:  Patient presented to [facility] after being medically cleared for an elective surgical procedure, having failed outpatient non-operative conservative management. Prophylactic antibiotics were started before the procedure and continued for 24 hours. They were admitted after surgery to the orthopedic floor.   There were no complications during the hospital stay.     Routine consults were obtained from the Anticoagulation Team for DVT/PE prophylaxis, from Physical Therapy for twice daily PT starting on POD 0, and from the Hospitalist for Medical Co-management. Patient was placed on initial dose of Heparin followed by ECASA 325 BID for anticoagulation.  Pertinent home medications were continued.  Daily labs were followed.      On POD 0 the pt was OOB with PT and there were no overnight events. POD1-2 he had some dizziness walking with PT that resolved on its own, after fluid bolus. He was monitored on telemetry for this overnight as recommended by medicine. PT was continued daily and pt had very little pain throughout his hospitalization requiring only tylenol. On POD 3 a new Aquacel dressing was applied. The pt is ready today for DC to home with home PT.  The orthopedic Attending is aware and agrees.

## 2017-05-10 NOTE — PROGRESS NOTE ADULT - SUBJECTIVE AND OBJECTIVE BOX
Patient seen and examined. Pain controlled. No acute events overnight     PTT - ( 09 May 2017 09:51 )  PTT:30.6 sec  Vital Signs Last 24 Hrs  T(C): 36.6, Max: 36.7 (05-09 @ 09:59)  T(F): 97.9, Max: 98.1 (05-09 @ 09:59)  HR: 67 (58 - 73)  BP: 114/64 (113/79 - 142/84)  BP(mean): --  RR: 16 (12 - 17)  SpO2: 93% (93% - 100%)    Physical Exam  Gen: NAD  LLE:   Dressing c/d/i  +HMV  +ehl/fhl/ta/gs function  L2-S1 silt  Dp/pt pulse intact  No calf ttp  Compartments soft    A/P: 76y Male sp L TKA POD 1  Pain control  DVT ppx  PT/WBAT/OOB  FU labs  Ice/elevate  Medical management appreciated  Incentive spirometry  Dispo planning

## 2017-05-10 NOTE — PROGRESS NOTE ADULT - SUBJECTIVE AND OBJECTIVE BOX
Orthopedics     POD 1 Left Total Knee Arthroplasty  Pain is controlled. Pt feeling remarkably well. No nausea or vomiting. Has been OOB with PT and able to bend his knee AROM to 80.    Vital Signs Last 24 Hrs  T(C): 37, Max: 37 (05-10 @ 07:07)  T(F): 98.6, Max: 98.6 (05-10 @ 07:07)  HR: 65 (58 - 73)  BP: 112/64 (112/64 - 142/84)  BP(mean): --  RR: 16 (12 - 17)  SpO2: 95% (93% - 100%)                        12.0   15.7  )-----------( 177      ( 10 May 2017 06:25 )             35.1     10 May 2017 06:25    141    |  107    |  25     ----------------------------<  119    5.0     |  27     |  0.80     Ca    8.6        10 May 2017 06:25      PT/INR - ( 09 May 2017 13:34 )   PT: 11.5 sec;   INR: 1.06 ratio         PTT - ( 09 May 2017 09:51 )  PTT:30.6 sec  Exam:  NAD AAOx3  Dressing clean and dry  +EHL FHL TA GS  SILT toes 1-5  +DP  Calf Soft NT    A/P:  Stable POD 1 Left Total Knee Arthroplasty  -Analgesia  -DVT PE ppx  -OOB PT  -Hemovac drain removed uneventfully

## 2017-05-10 NOTE — DISCHARGE NOTE ADULT - MEDICATION SUMMARY - MEDICATIONS TO TAKE
I will START or STAY ON the medications listed below when I get home from the hospital:    finasteride 5 mg oral tablet  -- 1 tab(s) by mouth once a day  -- Indication: For prior med    amLODIPine 5 mg oral tablet  -- 1 tab(s) by mouth once a day  -- Indication: For prior med I will START or STAY ON the medications listed below when I get home from the hospital:    finasteride 5 mg oral tablet  -- 1 tab(s) by mouth once a day  -- Indication: For prior med    Ecotrin 325 mg oral delayed release tablet  -- 1 tab(s) by mouth 2 times a day  -- Swallow whole.  Do not crush.  Take with food or milk.    -- Indication: For blood clot prevention. Do not take ASpirin 81mg with this.    oxyCODONE-acetaminophen 5mg-325mg oral tablet  -- 1 tab(s) by mouth every 4 hours, As Needed -for moderate pain 2 tabs PO q6h prn severe pain MDD:6  -- Caution federal law prohibits the transfer of this drug to any person other  than the person for whom it was prescribed.  May cause drowsiness.  Alcohol may intensify this effect.  Use care when operating dangerous machinery.  This prescription cannot be refilled.  This product contains acetaminophen.  Do not use  with any other product containing acetaminophen to prevent possible liver damage.  Using more of this medication than prescribed may cause serious breathing problems.    -- Indication: For severe pain    amLODIPine 5 mg oral tablet  -- 1 tab(s) by mouth once a day  -- Indication: For prior med    famotidine 20 mg oral tablet  -- 1 tab(s) by mouth once a day while on Aspirin MDD:1  -- Indication: For GI protection    Colace 100 mg oral capsule  -- 1 cap(s) by mouth 2 times a day -for constipation  -- Medication should be taken with plenty of water.    -- Indication: For stool softener I will START or STAY ON the medications listed below when I get home from the hospital:    finasteride 5 mg oral tablet  -- 1 tab(s) by mouth once a day  -- Indication: For prior med    oxyCODONE-acetaminophen 5mg-325mg oral tablet  -- 1 tab(s) by mouth every 4 hours, As Needed -for moderate pain 2 tabs PO q6h prn severe pain MDD:6  -- Caution federal law prohibits the transfer of this drug to any person other  than the person for whom it was prescribed.  May cause drowsiness.  Alcohol may intensify this effect.  Use care when operating dangerous machinery.  This prescription cannot be refilled.  This product contains acetaminophen.  Do not use  with any other product containing acetaminophen to prevent possible liver damage.  Using more of this medication than prescribed may cause serious breathing problems.    -- Indication: For severe pain    Aspirin Enteric Coated 325 mg oral delayed release tablet  -- 1 tab(s) by mouth 2 times a day  -- Swallow whole.  Do not crush.  Take with food or milk.    -- Indication: For dvt ppx    amLODIPine 5 mg oral tablet  -- 1 tab(s) by mouth once a day  -- Indication: For prior med    famotidine 20 mg oral tablet  -- 1 tab(s) by mouth once a day while on Aspirin MDD:1  -- Indication: For GI protection    Colace 100 mg oral capsule  -- 1 cap(s) by mouth 2 times a day -for constipation  -- Medication should be taken with plenty of water.    -- Indication: For stool softener

## 2017-05-10 NOTE — PROGRESS NOTE ADULT - SUBJECTIVE AND OBJECTIVE BOX
CC-Patient is a 76y old  Male who presents with a chief complaint of " I am having my left knee replaced." (09 May 2017 10:03)    HPI:  77yo/M with PMH BPH, HTN presented for elective LT TKR. Medical consult called for postop medical management.    PMH- as above  PSH- s/p RT TKR, deviated septum  Soc hx- alcohol- socially  Fam hx- m-dementia, f- HTN    5/9/17- +nausea  5/10/17 doing good. ambulated with PT    Review of system- All 10 systems reviewed and is as per HPI otherwise negative.     Vital Signs Last 24 Hrs  T(C): 37, Max: 37 (05-10 @ 07:07)  T(F): 98.6, Max: 98.6 (05-10 @ 07:07)  HR: 65 (58 - 69)  BP: 112/64 (112/64 - 142/84)  BP(mean): --  RR: 16 (12 - 17)  SpO2: 95% (93% - 100%)    LABS:                        12.0   15.7  )-----------( 177      ( 10 May 2017 06:25 )             35.1     10 May 2017 06:25    141    |  107    |  25     ----------------------------<  119    5.0     |  27     |  0.80     Ca    8.6        10 May 2017 06:25  PT/INR - ( 09 May 2017 13:34 )   PT: 11.5 sec;   INR: 1.06 ratio    PTT - ( 09 May 2017 09:51 )  PTT:30.6 sec    PHYSICAL EXAM:    GENERAL: NAD, well-groomed, well-developed  HEAD:  Atraumatic, Normocephalic  EYES: EOMI, PERRLA, conjunctiva and sclera clear  HEENT: Moist mucous membranes  NECK: Supple, No JVD  NERVOUS SYSTEM:  Alert & Oriented X3, Motor Strength 5/5 B/L upper and lower extremities; DTRs 2+ intact and symmetric  CHEST/LUNG: Clear to auscultation bilaterally; No rales, rhonchi, wheezing, or rubs  HEART: Regular rate and rhythm; No murmurs, rubs, or gallops  ABDOMEN: Soft, Nontender, Nondistended; Bowel sounds present  GENITOURINARY- Voiding, no palpable bladder  EXTREMITIES:  2+ Peripheral Pulses, No clubbing, cyanosis, or edema  MUSCULOSKELTAL- LT knee dressing dry, +hemovac    Daily Height in cm: 177.8 (09 May 2017 09:59)      sodium chloride 0.9% lock flush 3milliLiter(s) IV Push every 8 hours  acetaminophen   Tablet. 975milliGRAM(s) Oral every 6 hours  oxyCODONE ER Tablet 20milliGRAM(s) Oral every 12 hours  celecoxib 200milliGRAM(s) Oral with breakfast  oxyCODONE IR 5milliGRAM(s) Oral every 6 hours PRN  oxyCODONE IR 10milliGRAM(s) Oral every 6 hours PRN  HYDROmorphone  Injectable 0.5milliGRAM(s) IV Push every 3 hours PRN  ondansetron Injectable 4milliGRAM(s) IV Push every 6 hours PRN  sodium chloride 0.9%. 1000milliLiter(s) IV Continuous <Continuous>  acetaminophen   Tablet 650milliGRAM(s) Oral every 6 hours PRN  acetaminophen   Tablet 650milliGRAM(s) Oral every 6 hours PRN  acetaminophen   Tablet. 650milliGRAM(s) Oral every 6 hours PRN  aluminum hydroxide/magnesium hydroxide/simethicone Suspension 30milliLiter(s) Oral four times a day PRN  ondansetron Injectable 4milliGRAM(s) IV Push every 6 hours PRN  calcium carbonate 1250 mG + Vitamin D (OsCal 500 + D) 1Tablet(s) Oral three times a day  pantoprazole    Tablet 40milliGRAM(s) Oral daily  diphenhydrAMINE   Capsule 25milliGRAM(s) Oral at bedtime PRN  magnesium hydroxide Suspension 30milliLiter(s) Oral daily PRN  polyethylene glycol 3350 17Gram(s) Oral daily  senna 2Tablet(s) Oral at bedtime PRN  docusate sodium 100milliGRAM(s) Oral three times a day  ferrous    sulfate 325milliGRAM(s) Oral three times a day with meals  folic acid 1milliGRAM(s) Oral daily  multivitamin 1Tablet(s) Oral daily  ascorbic acid 500milliGRAM(s) Oral two times a day  amLODIPine   Tablet 5milliGRAM(s) Oral daily  finasteride 5milliGRAM(s) Oral daily  ceFAZolin   IVPB 2000milliGRAM(s) IV Intermittent every 6 hours      Assessment/Plan  #S/p LT TKR  Ortho f/u appreciated  Monitor HH  PT as tolerated  AC by Aspirin BID  Bowel regimen  Incentive spirometry    #BPH  Monitor for voiding difficulties    #HTN, stable    #Dispo- likely home with home PT in 1-2 days

## 2017-05-10 NOTE — DISCHARGE NOTE ADULT - PATIENT PORTAL LINK FT
“You can access the [employer] Patient Portal, offered by [facility], by registering with the following website: [email or web address]”

## 2017-05-11 LAB
ANION GAP SERPL CALC-SCNC: 6 MMOL/L — SIGNIFICANT CHANGE UP (ref 5–17)
BUN SERPL-MCNC: 19 MG/DL — SIGNIFICANT CHANGE UP (ref 7–23)
CALCIUM SERPL-MCNC: 8.1 MG/DL — LOW (ref 8.5–10.1)
CHLORIDE SERPL-SCNC: 109 MMOL/L — HIGH (ref 96–108)
CO2 SERPL-SCNC: 29 MMOL/L — SIGNIFICANT CHANGE UP (ref 22–31)
CREAT SERPL-MCNC: 0.71 MG/DL — SIGNIFICANT CHANGE UP (ref 0.5–1.3)
GLUCOSE SERPL-MCNC: 100 MG/DL — HIGH (ref 70–99)
HCT VFR BLD CALC: 31.9 % — LOW (ref 39–50)
HGB BLD-MCNC: 10.5 G/DL — LOW (ref 13–17)
MCHC RBC-ENTMCNC: 29.4 PG — SIGNIFICANT CHANGE UP (ref 27–34)
MCHC RBC-ENTMCNC: 32.9 GM/DL — SIGNIFICANT CHANGE UP (ref 32–36)
MCV RBC AUTO: 89.4 FL — SIGNIFICANT CHANGE UP (ref 80–100)
PLATELET # BLD AUTO: 146 K/UL — LOW (ref 150–400)
POTASSIUM SERPL-MCNC: 4 MMOL/L — SIGNIFICANT CHANGE UP (ref 3.5–5.3)
POTASSIUM SERPL-SCNC: 4 MMOL/L — SIGNIFICANT CHANGE UP (ref 3.5–5.3)
RBC # BLD: 3.56 M/UL — LOW (ref 4.2–5.8)
RBC # FLD: 12.4 % — SIGNIFICANT CHANGE UP (ref 10.3–14.5)
SODIUM SERPL-SCNC: 144 MMOL/L — SIGNIFICANT CHANGE UP (ref 135–145)
SURGICAL PATHOLOGY FINAL REPORT - CH: SIGNIFICANT CHANGE UP
WBC # BLD: 11.2 K/UL — HIGH (ref 3.8–10.5)
WBC # FLD AUTO: 11.2 K/UL — HIGH (ref 3.8–10.5)

## 2017-05-11 RX ORDER — ASPIRIN/CALCIUM CARB/MAGNESIUM 324 MG
1 TABLET ORAL
Qty: 60 | Refills: 0 | OUTPATIENT
Start: 2017-05-11 | End: 2017-06-10

## 2017-05-11 RX ORDER — SODIUM CHLORIDE 9 MG/ML
1000 INJECTION INTRAMUSCULAR; INTRAVENOUS; SUBCUTANEOUS ONCE
Qty: 0 | Refills: 0 | Status: COMPLETED | OUTPATIENT
Start: 2017-05-11 | End: 2017-05-11

## 2017-05-11 RX ADMIN — Medication 100 MILLIGRAM(S): at 15:43

## 2017-05-11 RX ADMIN — Medication 325 MILLIGRAM(S): at 05:36

## 2017-05-11 RX ADMIN — SODIUM CHLORIDE 1000 MILLILITER(S): 9 INJECTION INTRAMUSCULAR; INTRAVENOUS; SUBCUTANEOUS at 11:38

## 2017-05-11 RX ADMIN — FINASTERIDE 5 MILLIGRAM(S): 5 TABLET, FILM COATED ORAL at 11:41

## 2017-05-11 RX ADMIN — SODIUM CHLORIDE 3 MILLILITER(S): 9 INJECTION INTRAMUSCULAR; INTRAVENOUS; SUBCUTANEOUS at 05:26

## 2017-05-11 RX ADMIN — Medication 500 MILLIGRAM(S): at 17:08

## 2017-05-11 RX ADMIN — Medication 975 MILLIGRAM(S): at 05:36

## 2017-05-11 RX ADMIN — AMLODIPINE BESYLATE 5 MILLIGRAM(S): 2.5 TABLET ORAL at 05:37

## 2017-05-11 RX ADMIN — Medication 975 MILLIGRAM(S): at 21:24

## 2017-05-11 RX ADMIN — Medication 975 MILLIGRAM(S): at 07:20

## 2017-05-11 RX ADMIN — Medication 975 MILLIGRAM(S): at 17:27

## 2017-05-11 RX ADMIN — Medication 325 MILLIGRAM(S): at 17:07

## 2017-05-11 RX ADMIN — SODIUM CHLORIDE 3 MILLILITER(S): 9 INJECTION INTRAMUSCULAR; INTRAVENOUS; SUBCUTANEOUS at 21:25

## 2017-05-11 RX ADMIN — Medication 100 MILLIGRAM(S): at 21:22

## 2017-05-11 RX ADMIN — SODIUM CHLORIDE 3 MILLILITER(S): 9 INJECTION INTRAMUSCULAR; INTRAVENOUS; SUBCUTANEOUS at 15:41

## 2017-05-11 RX ADMIN — Medication 975 MILLIGRAM(S): at 21:23

## 2017-05-11 RX ADMIN — PANTOPRAZOLE SODIUM 40 MILLIGRAM(S): 20 TABLET, DELAYED RELEASE ORAL at 11:41

## 2017-05-11 RX ADMIN — Medication 100 MILLIGRAM(S): at 05:36

## 2017-05-11 NOTE — PROGRESS NOTE ADULT - SUBJECTIVE AND OBJECTIVE BOX
CC-Patient is a 76y old  Male who presents with a chief complaint of " I am having my left knee replaced." (09 May 2017 10:03)    HPI:  75yo/M with PMH BPH, HTN presented for elective LT TKR. Medical consult called for postop medical management.    PMH- as above  PSH- s/p RT TKR, deviated septum  Soc hx- alcohol- socially  Fam hx- m-dementia, f- HTN    5/9/17- +nausea  5/10/17 doing good. ambulated with PT  5/11/17 had another vagal episode today. Similar one yesterday.    Review of system- All 10 systems reviewed and is as per HPI otherwise negative.     Vital Signs Last 24 Hrs  T(C): 37.2, Max: 37.2 (05-11 @ 05:42)  T(F): 99, Max: 99 (05-11 @ 05:42)  HR: 76 (76 - 80)  BP: 133/70 (122/55 - 133/70)  BP(mean): --  RR: 16 (16 - 16)  SpO2: 94% (94% - 96%)    LABS:                        10.5   11.2  )-----------( 146      ( 11 May 2017 06:47 )             31.9     11 May 2017 06:47    144    |  109    |  19     ----------------------------<  100    4.0     |  29     |  0.71     Ca    8.1        11 May 2017 06:47  PT/INR - ( 09 May 2017 13:34 )   PT: 11.5 sec;   INR: 1.06 ratio      PHYSICAL EXAM:    GENERAL: NAD, well-groomed, well-developed  HEAD:  Atraumatic, Normocephalic  EYES: EOMI, PERRLA, conjunctiva and sclera clear  HEENT: Moist mucous membranes  NECK: Supple, No JVD  NERVOUS SYSTEM:  Alert & Oriented X3, Motor Strength 5/5 B/L upper and lower extremities; DTRs 2+ intact and symmetric  CHEST/LUNG: Clear to auscultation bilaterally; No rales, rhonchi, wheezing, or rubs  HEART: Regular rate and rhythm; No murmurs, rubs, or gallops  ABDOMEN: Soft, Nontender, Nondistended; Bowel sounds present  GENITOURINARY- Voiding, no palpable bladder  EXTREMITIES:  2+ Peripheral Pulses, No clubbing, cyanosis, or edema  MUSCULOSKELTAL- LT knee dressing dry, hemovac removed    Daily Height in cm: 177.8 (09 May 2017 09:59)      sodium chloride 0.9% lock flush 3milliLiter(s) IV Push every 8 hours  acetaminophen   Tablet. 975milliGRAM(s) Oral every 6 hours  oxyCODONE ER Tablet 20milliGRAM(s) Oral every 12 hours  celecoxib 200milliGRAM(s) Oral with breakfast  oxyCODONE IR 5milliGRAM(s) Oral every 6 hours PRN  oxyCODONE IR 10milliGRAM(s) Oral every 6 hours PRN  HYDROmorphone  Injectable 0.5milliGRAM(s) IV Push every 3 hours PRN  ondansetron Injectable 4milliGRAM(s) IV Push every 6 hours PRN  sodium chloride 0.9%. 1000milliLiter(s) IV Continuous <Continuous>  acetaminophen   Tablet 650milliGRAM(s) Oral every 6 hours PRN  acetaminophen   Tablet 650milliGRAM(s) Oral every 6 hours PRN  acetaminophen   Tablet. 650milliGRAM(s) Oral every 6 hours PRN  aluminum hydroxide/magnesium hydroxide/simethicone Suspension 30milliLiter(s) Oral four times a day PRN  ondansetron Injectable 4milliGRAM(s) IV Push every 6 hours PRN  calcium carbonate 1250 mG + Vitamin D (OsCal 500 + D) 1Tablet(s) Oral three times a day  pantoprazole    Tablet 40milliGRAM(s) Oral daily  diphenhydrAMINE   Capsule 25milliGRAM(s) Oral at bedtime PRN  magnesium hydroxide Suspension 30milliLiter(s) Oral daily PRN  polyethylene glycol 3350 17Gram(s) Oral daily  senna 2Tablet(s) Oral at bedtime PRN  docusate sodium 100milliGRAM(s) Oral three times a day  ferrous    sulfate 325milliGRAM(s) Oral three times a day with meals  folic acid 1milliGRAM(s) Oral daily  multivitamin 1Tablet(s) Oral daily  ascorbic acid 500milliGRAM(s) Oral two times a day  amLODIPine   Tablet 5milliGRAM(s) Oral daily  finasteride 5milliGRAM(s) Oral daily  ceFAZolin   IVPB 2000milliGRAM(s) IV Intermittent every 6 hours      Assessment/Plan  #S/p LT TKR  Ortho f/u appreciated  Monitor HH  PT as tolerated  AC by Aspirin BID  Bowel regimen  Incentive spirometry    #Vagal episodes  Likely volume depletion  IVF bolus. Monitor    #BPH  Monitor for voiding difficulties    #HTN, stable    #Dispo- likely home with home PT later on tonight vs tomorrow

## 2017-05-11 NOTE — CHART NOTE - NSCHARTNOTEFT_GEN_A_CORE
d/w patient and ortho    Vagal episodes  -hold discharge  -remote tele overnight  -reassess with PT tomorrow    Patient is in agreement

## 2017-05-11 NOTE — PROGRESS NOTE ADULT - SUBJECTIVE AND OBJECTIVE BOX
Pt S&E. Pain controlled. No acute events overnight  AVSS  Gen: NAD  Left Lower Extremity:  Dsg c/d/i  SILT L2-S1  +EHL/FHL/TA/Gastroc  DP+  Soft compartments, - calf ttp

## 2017-05-11 NOTE — PROGRESS NOTE ADULT - SUBJECTIVE AND OBJECTIVE BOX
Orthopedics     POD 2 Total Knee Arthroplasty  Pain is controlled. Pt feeling well. Had vaso vagal episode with PT earlier, now resolved. Spoke with hospitalist regarding it, was bolused. No nausea or vomiting. No CP no SOB. No palpitations.    Vital Signs Last 24 Hrs  T(C): 37.2, Max: 37.2 (05-11 @ 05:42)  T(F): 99, Max: 99 (05-11 @ 05:42)  HR: 76 (76 - 80)  BP: 96/62 (96/62 - 133/70)  BP(mean): --  RR: 16 (16 - 16)  SpO2: 94% (94% - 96%)                        10.5   11.2  )-----------( 146      ( 11 May 2017 06:47 )             31.9     11 May 2017 06:47    144    |  109    |  19     ----------------------------<  100    4.0     |  29     |  0.71     Ca    8.1        11 May 2017 06:47          Exam:  NAD AAOx3  Dressing dry and clean  +EHL FHL TA GS  SILT toes 1-5  +DP  Calf Soft NT    A/P:  Stable POD 2 Total Knee Arthroplasty  -Analgesia  -DVT PE ppx  -OOB PT  -New Aquacel dressing earlier  -DC planning for today after up with PT again if no further vaso vagal episodes, discussed with hospitalist.  -Pt eager to go home today.

## 2017-05-12 VITALS
RESPIRATION RATE: 16 BRPM | SYSTOLIC BLOOD PRESSURE: 137 MMHG | DIASTOLIC BLOOD PRESSURE: 78 MMHG | TEMPERATURE: 99 F | OXYGEN SATURATION: 98 % | HEART RATE: 82 BPM

## 2017-05-12 LAB
ANION GAP SERPL CALC-SCNC: 5 MMOL/L — SIGNIFICANT CHANGE UP (ref 5–17)
BUN SERPL-MCNC: 16 MG/DL — SIGNIFICANT CHANGE UP (ref 7–23)
CALCIUM SERPL-MCNC: 8.2 MG/DL — LOW (ref 8.5–10.1)
CHLORIDE SERPL-SCNC: 107 MMOL/L — SIGNIFICANT CHANGE UP (ref 96–108)
CO2 SERPL-SCNC: 31 MMOL/L — SIGNIFICANT CHANGE UP (ref 22–31)
CREAT SERPL-MCNC: 0.76 MG/DL — SIGNIFICANT CHANGE UP (ref 0.5–1.3)
GLUCOSE SERPL-MCNC: 102 MG/DL — HIGH (ref 70–99)
HCT VFR BLD CALC: 32.6 % — LOW (ref 39–50)
HGB BLD-MCNC: 10.5 G/DL — LOW (ref 13–17)
MCHC RBC-ENTMCNC: 29.6 PG — SIGNIFICANT CHANGE UP (ref 27–34)
MCHC RBC-ENTMCNC: 32.3 GM/DL — SIGNIFICANT CHANGE UP (ref 32–36)
MCV RBC AUTO: 91.7 FL — SIGNIFICANT CHANGE UP (ref 80–100)
PLATELET # BLD AUTO: 157 K/UL — SIGNIFICANT CHANGE UP (ref 150–400)
POTASSIUM SERPL-MCNC: 4.3 MMOL/L — SIGNIFICANT CHANGE UP (ref 3.5–5.3)
POTASSIUM SERPL-SCNC: 4.3 MMOL/L — SIGNIFICANT CHANGE UP (ref 3.5–5.3)
RBC # BLD: 3.55 M/UL — LOW (ref 4.2–5.8)
RBC # FLD: 12.2 % — SIGNIFICANT CHANGE UP (ref 10.3–14.5)
SODIUM SERPL-SCNC: 143 MMOL/L — SIGNIFICANT CHANGE UP (ref 135–145)
WBC # BLD: 11.4 K/UL — HIGH (ref 3.8–10.5)
WBC # FLD AUTO: 11.4 K/UL — HIGH (ref 3.8–10.5)

## 2017-05-12 RX ORDER — SODIUM CHLORIDE 9 MG/ML
1000 INJECTION INTRAMUSCULAR; INTRAVENOUS; SUBCUTANEOUS ONCE
Qty: 0 | Refills: 0 | Status: COMPLETED | OUTPATIENT
Start: 2017-05-12 | End: 2017-05-12

## 2017-05-12 RX ADMIN — SODIUM CHLORIDE 3 MILLILITER(S): 9 INJECTION INTRAMUSCULAR; INTRAVENOUS; SUBCUTANEOUS at 06:18

## 2017-05-12 RX ADMIN — PANTOPRAZOLE SODIUM 40 MILLIGRAM(S): 20 TABLET, DELAYED RELEASE ORAL at 09:32

## 2017-05-12 RX ADMIN — Medication 325 MILLIGRAM(S): at 17:33

## 2017-05-12 RX ADMIN — SODIUM CHLORIDE 1000 MILLILITER(S): 9 INJECTION INTRAMUSCULAR; INTRAVENOUS; SUBCUTANEOUS at 12:03

## 2017-05-12 RX ADMIN — AMLODIPINE BESYLATE 5 MILLIGRAM(S): 2.5 TABLET ORAL at 06:19

## 2017-05-12 RX ADMIN — Medication 100 MILLIGRAM(S): at 06:19

## 2017-05-12 RX ADMIN — SODIUM CHLORIDE 3 MILLILITER(S): 9 INJECTION INTRAMUSCULAR; INTRAVENOUS; SUBCUTANEOUS at 12:18

## 2017-05-12 RX ADMIN — Medication 1 TABLET(S): at 06:19

## 2017-05-12 RX ADMIN — Medication 975 MILLIGRAM(S): at 06:21

## 2017-05-12 RX ADMIN — Medication 975 MILLIGRAM(S): at 06:19

## 2017-05-12 RX ADMIN — Medication 975 MILLIGRAM(S): at 11:58

## 2017-05-12 RX ADMIN — Medication 325 MILLIGRAM(S): at 06:19

## 2017-05-12 RX ADMIN — POLYETHYLENE GLYCOL 3350 17 GRAM(S): 17 POWDER, FOR SOLUTION ORAL at 09:35

## 2017-05-12 RX ADMIN — FINASTERIDE 5 MILLIGRAM(S): 5 TABLET, FILM COATED ORAL at 09:32

## 2017-05-12 RX ADMIN — Medication 1 TABLET(S): at 09:35

## 2017-05-12 RX ADMIN — Medication 500 MILLIGRAM(S): at 06:19

## 2017-05-12 RX ADMIN — Medication 325 MILLIGRAM(S): at 09:35

## 2017-05-12 RX ADMIN — Medication 1 MILLIGRAM(S): at 09:35

## 2017-05-12 RX ADMIN — MAGNESIUM HYDROXIDE 30 MILLILITER(S): 400 TABLET, CHEWABLE ORAL at 12:02

## 2017-05-12 NOTE — PROGRESS NOTE ADULT - SUBJECTIVE AND OBJECTIVE BOX
Patient cleared by physical therapy for discharge. Dr. Nj was notified who then also cleared the patient for discharge.

## 2017-05-12 NOTE — PROGRESS NOTE ADULT - SUBJECTIVE AND OBJECTIVE BOX
Patient seen and examined. Pain controlled. No acute events overnight    HEALTH ISSUES - PROBLEM Dx:        MEDICATIONS  (STANDING):  sodium chloride 0.9% lock flush 3milliLiter(s) IV Push every 8 hours  acetaminophen   Tablet. 975milliGRAM(s) Oral every 6 hours  celecoxib 200milliGRAM(s) Oral with breakfast  sodium chloride 0.9%. 1000milliLiter(s) IV Continuous <Continuous>  aspirin enteric coated 325milliGRAM(s) Oral two times a day  calcium carbonate 1250 mG + Vitamin D (OsCal 500 + D) 1Tablet(s) Oral three times a day  pantoprazole    Tablet 40milliGRAM(s) Oral daily  polyethylene glycol 3350 17Gram(s) Oral daily  docusate sodium 100milliGRAM(s) Oral three times a day  ferrous    sulfate 325milliGRAM(s) Oral three times a day with meals  folic acid 1milliGRAM(s) Oral daily  multivitamin 1Tablet(s) Oral daily  ascorbic acid 500milliGRAM(s) Oral two times a day  amLODIPine   Tablet 5milliGRAM(s) Oral daily  finasteride 5milliGRAM(s) Oral daily    Allergies    No Known Allergies    Intolerances                            10.5   11.2  )-----------( 146      ( 11 May 2017 06:47 )             31.9     11 May 2017 06:47    144    |  109    |  19     ----------------------------<  100    4.0     |  29     |  0.71     Ca    8.1        11 May 2017 06:47        Vital Signs Last 24 Hrs  T(C): 36.8, Max: 37.6 (05-11 @ 21:19)  T(F): 98.3, Max: 99.7 (05-11 @ 21:19)  HR: 81 (81 - 97)  BP: 147/88 (96/62 - 147/88)  BP(mean): --  RR: 16 (16 - 16)  SpO2: 99% (96% - 99%)    Physical Exam  Gen: NAD  LLE:   Dressing c/d/i, changed today  +ehl/fhl/ta/gs function  L2-S1 silt  Dp/pt pulse intact  No calf ttp  Compartments soft

## 2017-05-12 NOTE — PROVIDER CONTACT NOTE (OTHER) - ASSESSMENT
pt ambulating with PT. felt dizzy and nauseous. BP 96/62 HR 88. Pt back to bed
notified dr. grullon pt participated in PT without feeling dizzy.
pt ambulating with PT. went into BR to have BM. upon bearing down felt dizzy, nauseous, and diaphoretic. Spoke with [employer] tele tech pt HR went to 120 for brief moment, remaining in sinus tach.

## 2017-05-12 NOTE — PROGRESS NOTE ADULT - ASSESSMENT
A/P: 76y Male sp L TKA POD 3  Pain control  DVT ppx  PT/WBAT/OOB  FU labs  Ice/elevate  Medical management appreciated  Incentive spirometry  Dispo planning

## 2017-05-17 DIAGNOSIS — I10 ESSENTIAL (PRIMARY) HYPERTENSION: ICD-10-CM

## 2017-05-17 DIAGNOSIS — M10.9 GOUT, UNSPECIFIED: ICD-10-CM

## 2017-05-17 DIAGNOSIS — D62 ACUTE POSTHEMORRHAGIC ANEMIA: ICD-10-CM

## 2017-05-17 DIAGNOSIS — N40.0 BENIGN PROSTATIC HYPERPLASIA WITHOUT LOWER URINARY TRACT SYMPTOMS: ICD-10-CM

## 2017-05-17 DIAGNOSIS — M17.12 UNILATERAL PRIMARY OSTEOARTHRITIS, LEFT KNEE: ICD-10-CM

## 2017-05-17 DIAGNOSIS — R00.0 TACHYCARDIA, UNSPECIFIED: ICD-10-CM

## 2017-05-17 DIAGNOSIS — I45.10 UNSPECIFIED RIGHT BUNDLE-BRANCH BLOCK: ICD-10-CM

## 2018-11-09 NOTE — PATIENT PROFILE ADULT. - HAS THE PATIENT HAD A SIGNIFICANT CHANGE IN FUNCTIONAL STATUS DUE TO CVA, HEAD TRAUMA, ORTHOPEDIC TRAUMA/SURGERY, OR FALL, WITH THE WEEK PRIOR TO ADMISSION

## 2023-12-26 NOTE — H&P PST ADULT - GASTROINTESTINAL DETAILS
Full head to toe physical assessment deferred to ER provider; see provider notes.     
normal/nontender/soft

## 2025-01-14 NOTE — PROGRESS NOTE ADULT - SUBJECTIVE AND OBJECTIVE BOX
----- Message from Rosina Daly NP sent at 1/14/2025  2:15 PM CST -----  Labs are completely normal except very low Vit D and LDL up.   Ergocalciferol 28646 IU weekly # 12, 3   brain imaging may be considered if ongoing weakness and tremors.    CC-Patient is a 76y old  Male who presents with a chief complaint of " I am having my left knee replaced." (09 May 2017 10:03)    HPI:  75yo/M with PMH BPH, HTN presented for elective LT TKR. Medical consult called for postop medical management.    PMH- as above  PSH- s/p RT TKR, deviated septum  Soc hx- alcohol- socially  Fam hx- m-dementia, f- HTN    5/9/17- +nausea  5/10/17 doing good. ambulated with PT  5/11/17 had another vagal episode today. Similar one yesterday.  5/12/17 pt had another vagal episode after PT on the toilet while having BM- tele showed sinus tachy.    Review of system- All 10 systems reviewed and is as per HPI otherwise negative.     Vital Signs Last 24 Hrs  T(C): 36.8, Max: 37.6 (05-11 @ 21:19)  T(F): 98.3, Max: 99.7 (05-11 @ 21:19)  HR: 81 (81 - 97)  BP: 147/88 (129/63 - 147/88)  BP(mean): --  RR: 16 (16 - 16)  SpO2: 99% (96% - 99%)    LABS:                        10.5   11.4  )-----------( 157      ( 12 May 2017 06:06 )             32.6     12 May 2017 06:06    143    |  107    |  16     ----------------------------<  102    4.3     |  31     |  0.76     Ca    8.2        12 May 2017 06:06    PHYSICAL EXAM:    GENERAL: NAD, well-groomed, well-developed  HEAD:  Atraumatic, Normocephalic  EYES: EOMI, PERRLA, conjunctiva and sclera clear  HEENT: Moist mucous membranes  NECK: Supple, No JVD  NERVOUS SYSTEM:  Alert & Oriented X3, Motor Strength 5/5 B/L upper and lower extremities; DTRs 2+ intact and symmetric  CHEST/LUNG: Clear to auscultation bilaterally; No rales, rhonchi, wheezing, or rubs  HEART: Regular rate and rhythm; No murmurs, rubs, or gallops  ABDOMEN: Soft, Nontender, Nondistended; Bowel sounds present  GENITOURINARY- Voiding, no palpable bladder  EXTREMITIES:  2+ Peripheral Pulses, No clubbing, cyanosis, or edema  MUSCULOSKELTAL- LT knee dressing dry, hemovac removed    Daily Height in cm: 177.8 (09 May 2017 09:59)      sodium chloride 0.9% lock flush 3milliLiter(s) IV Push every 8 hours  acetaminophen   Tablet. 975milliGRAM(s) Oral every 6 hours  oxyCODONE ER Tablet 20milliGRAM(s) Oral every 12 hours  celecoxib 200milliGRAM(s) Oral with breakfast  oxyCODONE IR 5milliGRAM(s) Oral every 6 hours PRN  oxyCODONE IR 10milliGRAM(s) Oral every 6 hours PRN  HYDROmorphone  Injectable 0.5milliGRAM(s) IV Push every 3 hours PRN  ondansetron Injectable 4milliGRAM(s) IV Push every 6 hours PRN  sodium chloride 0.9%. 1000milliLiter(s) IV Continuous <Continuous>  acetaminophen   Tablet 650milliGRAM(s) Oral every 6 hours PRN  acetaminophen   Tablet 650milliGRAM(s) Oral every 6 hours PRN  acetaminophen   Tablet. 650milliGRAM(s) Oral every 6 hours PRN  aluminum hydroxide/magnesium hydroxide/simethicone Suspension 30milliLiter(s) Oral four times a day PRN  ondansetron Injectable 4milliGRAM(s) IV Push every 6 hours PRN  calcium carbonate 1250 mG + Vitamin D (OsCal 500 + D) 1Tablet(s) Oral three times a day  pantoprazole    Tablet 40milliGRAM(s) Oral daily  diphenhydrAMINE   Capsule 25milliGRAM(s) Oral at bedtime PRN  magnesium hydroxide Suspension 30milliLiter(s) Oral daily PRN  polyethylene glycol 3350 17Gram(s) Oral daily  senna 2Tablet(s) Oral at bedtime PRN  docusate sodium 100milliGRAM(s) Oral three times a day  ferrous    sulfate 325milliGRAM(s) Oral three times a day with meals  folic acid 1milliGRAM(s) Oral daily  multivitamin 1Tablet(s) Oral daily  ascorbic acid 500milliGRAM(s) Oral two times a day  amLODIPine   Tablet 5milliGRAM(s) Oral daily  finasteride 5milliGRAM(s) Oral daily  ceFAZolin   IVPB 2000milliGRAM(s) IV Intermittent every 6 hours      Assessment/Plan  #S/p LT TKR  Ortho f/u appreciated  Monitor HH  PT as tolerated  AC by Aspirin BID  Bowel regimen  Incentive spirometry    #Vagal episodes likely 2 to volume depletion+ anemia acute blood loss postop  Remote tele showed sinus tachy  HH 10.5. Though anemia is not significant enough by number, patient is very symptomatic.   D/w ortho- will transfuse 1 Unit PRBC+ 1 liter of IVF bolus.   Reassess with PT afterwards    #BPH  Monitor for voiding difficulties    #HTN, stable    #Dispo- transfuse then reassess.